# Patient Record
(demographics unavailable — no encounter records)

---

## 2024-10-31 NOTE — ASSESSMENT
[FreeTextEntry1] : His estradiol is elevated as is his hematocrit.  Will have him donate blood, 1 pint will be enough.  Additionally, will start him on Arimidex one half tab every Monday and Friday; all usage, dosage, mechanism of action, and adverse events fully reviewed.  He knowsit is an off label use.  Will continue 0.25 mL TC also off label, every Monday/Thursday.  He will continue sildenafil as needed.  All usage, dosage, mechanism of action, and adverse events of medications reviewed.

## 2024-10-31 NOTE — HISTORY OF PRESENT ILLNESS
[FreeTextEntry1] : Henry is a 57-year-old male born 04/05/1967 who we have been following for erectile dysfunction and low testosterone.  He was last seen on July 31, 2024 and is currently managed on 0.25 of testosterone cypionate subcutaneous injections twice weekly on Monday and Thursday.  He presents today to review his hormone panel.    His erections are well-managed with sildenafil 100 mg as needed.  Trough values from 10/7/2024: Estradiol 54 Total testosterone 759 Free 120.7 Bioavailable 148.3 SHBG 30  Peak values from 10/9/2024: CBC demonstrates slightly elevated hematocrit at 50.2 Estradiol 15 Total testosterone 555 Free 71.3 Bioavailable 143.5 Liver function testing within normal limits  [Erectile Dysfunction] : Erectile Dysfunction

## 2024-10-31 NOTE — LETTER HEADER
[FreeTextEntry3] : Lorie Ritchie MD [facility], [address] [place] [address]

## 2024-10-31 NOTE — LETTER BODY
[Dear  ___] : Dear  [unfilled], [Courtesy Letter:] : I had the pleasure of seeing your patient, [unfilled], in my office today. [Please see my note below.] : Please see my note below. [Sincerely,] : Sincerely, [FreeTextEntry2] : Jay Hare MD [address] [place] [id number]

## 2024-10-31 NOTE — LETTER BODY
[Dear  ___] : Dear  [unfilled], [Courtesy Letter:] : I had the pleasure of seeing your patient, [unfilled], in my office today. [Please see my note below.] : Please see my note below. [Sincerely,] : Sincerely, [FreeTextEntry2] : Jay Hare MD [address] [place] [id number]

## 2024-10-31 NOTE — PHYSICAL EXAM
[General Appearance - Well Developed] : well developed [General Appearance - Well Nourished] : well nourished [Normal Appearance] : normal appearance [Well Groomed] : well groomed [General Appearance - In No Acute Distress] : no acute distress [Edema] : no peripheral edema [Respiration, Rhythm And Depth] : normal respiratory rhythm and effort [Exaggerated Use Of Accessory Muscles For Inspiration] : no accessory muscle use [Normal Station and Gait] : the gait and station were normal for the patient's age [] : no rash [No Focal Deficits] : no focal deficits [Oriented To Time, Place, And Person] : oriented to person, place, and time [Affect] : the affect was normal [Mood] : the mood was normal [Not Anxious] : not anxious [Chaperone Present] : A chaperone was present in the examining room during all aspects of the physical examination [FreeTextEntry2] : JERMAINE WATKINS

## 2024-10-31 NOTE — LETTER HEADER
[FreeTextEntry3] : Lorie Ritchie MD [facility], [address] [place] [address]

## 2025-01-29 NOTE — ADDENDUM
[FreeTextEntry1] :  Longitudinal care (CPT code ): - The patient is diagnosed with hypogonadism , which is a chronic condition due to lifestyle choices or metabolic dysregulation, and requires longitudinal care to prevent disease progression and systemic complications.    Longitudinal care (CPT code ): - The patient is diagnosed with erectile dysfunction , which is a chronic condition due to lifestyle choices or metabolic dysregulation, and requires longitudinal care to prevent disease progression and systemic complications.

## 2025-01-29 NOTE — LETTER BODY
[Dear  ___] : Dear  [unfilled], [Courtesy Letter:] : I had the pleasure of seeing your patient, [unfilled], in my office today. [Please see my note below.] : Please see my note below. [Sincerely,] : Sincerely, [FreeTextEntry2] : Jay Hare MD [address] [place] [id number]

## 2025-01-29 NOTE — ASSESSMENT
[FreeTextEntry1] : He is doing well I do not know why the hemoglobin drop without him donating blood but he still high normal, community could use blood anyway so he is good to go donate.  He will stay with these numbers will get blood in 2 months I will see him in 2-1/2 before I retire especially since the last set of bloods were done on day 3 instead of and if he runs into trouble he will give me a call.  He understands that of some acute swelling after that he will be seeing Dr. Pereira who is the new director of male sexual health

## 2025-01-29 NOTE — LETTER HEADER
[FreeTextEntry3] : Lorie Ritchie MD [facility]1 [facility], [address] [place] [address]

## 2025-01-29 NOTE — PHYSICAL EXAM
[General Appearance - Well Developed] : well developed [General Appearance - Well Nourished] : well nourished [Normal Appearance] : normal appearance [Well Groomed] : well groomed [General Appearance - In No Acute Distress] : no acute distress [Heart Rate And Rhythm] : heart rate and rhythm were normal [Edema] : no peripheral edema [Respiration, Rhythm And Depth] : normal respiratory rhythm and effort [Exaggerated Use Of Accessory Muscles For Inspiration] : no accessory muscle use [Auscultation Breath Sounds / Voice Sounds] : lungs were clear to auscultation bilaterally [Abdomen Soft] : soft [Abdomen Tenderness] : non-tender [Costovertebral Angle Tenderness] : no ~M costovertebral angle tenderness [Normal Station and Gait] : the gait and station were normal for the patient's age [] : no rash [No Focal Deficits] : no focal deficits [Oriented To Time, Place, And Person] : oriented to person, place, and time [Affect] : the affect was normal [Mood] : the mood was normal [Not Anxious] : not anxious [FreeTextEntry1] : 31.1

## 2025-01-29 NOTE — HISTORY OF PRESENT ILLNESS
[Erectile Dysfunction] : Erectile Dysfunction [FreeTextEntry1] : Henry is a 57-year-old male born 04/05/1967 who we have been following for erectile dysfunction and low testosterone.  He was last seen on October 28, 2024, and we asked him to donate blood, 1 pint will be enough. Additionally, will start him on Arimidex one half tab every Monday and Friday; all usage, dosage, mechanism of action, and adverse events fully reviewed. He knows it is an off label use. He will continue 0.25 mL TC also off label, every Monday/Thursday. He will continue sildenafil as needed. All usage, dosage, mechanism of action, and adverse events of medications reviewed.  He tells me that he is feeling well sildenafil is working well he never donated the blood cannot explain why and I reviewed with him that the lack of compliance has not insignificant risk.  He had blood test done on December 30 and January 2 and the labs were close January 1, it is not my preference would rather he do that at the next week and in the future if he cannot do with the 2 days apart that I recommend he switch to the next week.  Regardless what he had with him taking 0.25 mL of testosterone cypionate Monday morning Thursday nights and a half a tablet of Arimidex Mondays and Fridays was Total testosterone went from 858 up to 573 Free testosterone went from 148 up to 97.1 Bioavailable testosterone went from 310.6 up to 195.4 Estradiol went from 19 up to 20 Sex hormone binding globulin/albumin was 28/4.6 and on repeat was 26/4.4 Hemoglobin was 15.8 with hematocrit of 47.2 with a platelet count of 300,000 please note his white count was only 3.4 which is his Hx

## 2025-04-18 NOTE — LETTER HEADER
[FreeTextEntry3] : Lorie Ritchie MD [facility]1 [facility], [address] [place] [address]

## 2025-04-18 NOTE — HISTORY OF PRESENT ILLNESS
[Erectile Dysfunction] : Erectile Dysfunction [FreeTextEntry1] : Henry is a 58-year-old male born 04/05/1967 who we have been following for erectile dysfunction and low testosterone.  He was last seen on 01/29/2025, and he had just donated blood, one month ago. He is also on Arimidex one half tab every Monday and Friday with 0.25 mL TC also off label, every Monday/Thursday. He will continue sildenafil as needed. All usage, dosage, mechanism of action, and adverse events of medications reviewed.  He is doing well. He will stay with these numbers will get blood in 2 months I will see him in 2-1/2 before I retire especially since the last set of bloods were done on day 3 instead of and if he runs into trouble he will give me a call.   He had trough bloods done on the 24th and peak bloods done on 26 March with him taking 0.25 mL of testosterone cypionate subcutaneous for off label use on Monday morning Thursday nights and Arimidex at half a tablet Mondays and Fridays Total testosterone was 617 and went to 851 Free testosterone was 75.3 and went to 128.5 Bioavailable testosterone was 151.5 and went to 264.3 Sex hormone binding globulin was 39 and 33 Albumin was 4.4 and 4.5 Estradiol was 26 and went to less than 15 Liver function tests were normal White count was only 2.8 he may have had a virus Hemoglobin was 15.4 as above he had donated blood a week before Platelet count was 228,000

## 2025-04-18 NOTE — LETTER BODY
[Dear  ___] : Dear  [unfilled], [Courtesy Letter:] : I had the pleasure of seeing your patient, [unfilled], in my office today. [Please see my note below.] : Please see my note below. [Sincerely,] : Sincerely, [FreeTextEntry2] : Jay Hare MD [address] [place] [phone]

## 2025-04-18 NOTE — ASSESSMENT
[FreeTextEntry1] : He is doing great his numbers except for the estradiol are excellent and he is lost 8 pounds since October which may explain why the estradiol was low Wednesday morning.  He will continue to take it Friday mornings because it is wearing off by the time he got bloods on Monday 9/1 his estradiol going too high.  As far as the testosterone dose that is right on the money and as far as his sex life he has the sildenafil when his wife is in the mood right now he is no longer the rate limiting step To summarize Continue the testosterone cypionate 0.25 mL subcutaneous and an off-label use Monday mornings and Thursday nights Decrease the Arimidex to half a tablet Friday mornings Continue with sildenafil as needed Blood in 2-1/2 months and see Dr. Pereira in 3

## 2025-04-18 NOTE — PHYSICAL EXAM
[General Appearance - Well Developed] : well developed [General Appearance - Well Nourished] : well nourished [Normal Appearance] : normal appearance [Well Groomed] : well groomed [General Appearance - In No Acute Distress] : no acute distress [Heart Rate And Rhythm] : heart rate and rhythm were normal [Edema] : no peripheral edema [Respiration, Rhythm And Depth] : normal respiratory rhythm and effort [Exaggerated Use Of Accessory Muscles For Inspiration] : no accessory muscle use [Auscultation Breath Sounds / Voice Sounds] : lungs were clear to auscultation bilaterally [Abdomen Soft] : soft [Abdomen Tenderness] : non-tender [Costovertebral Angle Tenderness] : no ~M costovertebral angle tenderness [Normal Station and Gait] : the gait and station were normal for the patient's age [] : no rash [No Focal Deficits] : no focal deficits [Oriented To Time, Place, And Person] : oriented to person, place, and time [Affect] : the affect was normal [Mood] : the mood was normal [Not Anxious] : not anxious [FreeTextEntry1] : 30.68

## 2025-05-19 NOTE — ASSESSMENT
[FreeTextEntry1] : Mr. Butt is a 58-year-old man with history of Hashimoto Thyroiditis, minimal non-obstructive CAD, hypertension, and dyslipidemia presenting for follow up.  Impression: (1) Minimal non-obstructive CAD by CCTA 9/2020, CAC 31 (70%ile for age/gender/ethinicity) with minimal luminal irregularities. NM stress negative 2022 at another site. (2) Dyslipidemia, LDL 66 on Repatha, statin intolerant (3) Hypertension, on benazepril 20mg, elevated BP in office, states home pressures run normal (4) Hashimoto thyroiditis, on levothyroxine 200mcg  Plan: - Discussed his diagnosis of minimal non-obstructive CAD in detail. - Continue Repatha - Continue benazepril; advised home BP monitoring. He will monitor at home and has follow up with his PCP next month. Consider adding on HCTZ 12.5mg and/or switching to olmesartan 20mg. - Primordial and primary ASCVD risk prevention was discussed. Patient was informed of the "Essential 8" guidelines for health including: (1) physical activity (>150min moderate or >75min vigorous exercise per week); (2) diet [I recommend a pesco-Mediterranean diet with ~16 hours of daily fasting]; (3) adequate/good quality sleep; (4) weight loss (BMI <25); (5) avoidance/cessation of smoking; (6) cholesterol (LDL as low as possible; TChol <200); (7) BP control (<120/80); (8) glucose control (fasting BG <100). Source: https://www.ahajournals.org/doi/epdf/10.1161/CIR.8881180596801382  RTC annually, sooner as needed

## 2025-05-19 NOTE — HISTORY OF PRESENT ILLNESS
[FreeTextEntry1] : Mr. Butt is a 58-year-old man with history of Hashimoto Thyroiditis, minimal non-obstructive CAD, hypertension, and dyslipidemia presenting for follow up.  Previously followed with Dr. Richards and was last seen by him in office 12/2023. Today denies any complaints.  CCTA 9/2020 - CAC 31, minimal irregularities, 70%ile NM Stress 6/2022 - No ischemia  Labs: - Cr 1.06, K 4.4, normal transaminases - , , HDL 36, LDL 93 -> LDL 66, TG 60 - A1c 5.1%, TSH 2.3  Meds: - Repatha - Benazepril 20mg - Omeprazole - Sildenaril - Levothyroxine 200mcg - Testosterone

## 2025-07-01 NOTE — ASSESSMENT
[FreeTextEntry1] : 58 year old male patient who present for f/u evaluation of hypothyroidism due to hashimoto previous endo : Dr Sullivan  # hypothyroidism secondary to Hashimoto - diagnosed in 2007 has been on Lt4 dose needed to be titrated up - now on Lt4 200 mcg daily - 4/2025: recent tfts normal and he feels ok clinically euthyroid - no reported thyroid nodules in the past  continue same dose of Lt4 200 mcg daily , can monitor with PCP ( Dr Hare ) and return PRN if tfts abnormal   He is also on TRT managed by urology

## 2025-07-01 NOTE — PHYSICAL EXAM
[Alert] : alert [Healthy Appearance] : healthy appearance [No Acute Distress] : no acute distress [No Proptosis] : no proptosis [No Lid Lag] : no lid lag [Thyroid Not Enlarged] : the thyroid was not enlarged [No Thyroid Nodules] : no palpable thyroid nodules [No Respiratory Distress] : no respiratory distress [No Accessory Muscle Use] : no accessory muscle use [Clear to Auscultation] : lungs were clear to auscultation bilaterally [Normal S1, S2] : normal S1 and S2 [No Murmurs] : no murmurs [Regular Rhythm] : with a regular rhythm [No Edema] : no peripheral edema [No Stigmata of Cushings Syndrome] : no stigmata of Cushings Syndrome [No Tremors] : no tremors [Oriented x3] : oriented to person, place, and time

## 2025-07-01 NOTE — HISTORY OF PRESENT ILLNESS
[FreeTextEntry1] : 58 year old male patient who present for follow up evaluation of hypothyroidism due to Hashimoto  previous endo : Dr Sullivan   # hypothyroidism secondary to Hashimoto  - diagnosed in 2007  has been on Lt4 dose needed to be titrated up  - now on Lt4 200 mcg daily  - 4/2025: recent tfts normal and he feels ok clinically euthyroid  - no reported thyroid  nodules in the past   He is also on TRT managed by urology